# Patient Record
Sex: FEMALE | URBAN - METROPOLITAN AREA
[De-identification: names, ages, dates, MRNs, and addresses within clinical notes are randomized per-mention and may not be internally consistent; named-entity substitution may affect disease eponyms.]

---

## 2020-07-25 ENCOUNTER — TELEPHONE ENCOUNTER (OUTPATIENT)
Dept: URBAN - METROPOLITAN AREA CLINIC 13 | Facility: CLINIC | Age: 75
End: 2020-07-25

## 2020-07-25 RX ORDER — IPRATROPIUM BROMIDE AND ALBUTEROL SULFATE 2.5; .5 MG/3ML; MG/3ML
USE 1 VIAL VIA NEBULIZER EVERY 8 HOURS MAY USE EVERY 4 HOURS IF NEEDED SOLUTION RESPIRATORY (INHALATION)
Qty: 90 | Refills: 0 | OUTPATIENT
Start: 2012-05-21 | End: 2012-06-19

## 2020-07-25 RX ORDER — ESOMEPRAZOLE MAGNESIUM 40 MG
TAKE 1 CAPSULE TWICE A DAY CAPSULE,DELAYED RELEASE (ENTERIC COATED) ORAL
Qty: 180 | Refills: 4 | OUTPATIENT
Start: 2012-04-06 | End: 2017-10-24

## 2020-07-25 RX ORDER — CITALOPRAM 20 MG/1
TAKE 1 TABLET DAILY AS DIRECTED TABLET ORAL
Refills: 0 | OUTPATIENT
End: 2017-10-24

## 2020-07-25 RX ORDER — OMEPRAZOLE 40 MG/1
TAKE 1 CAPSULE BY MOUTH  DAILY CAPSULE, DELAYED RELEASE ORAL
Qty: 90 | Refills: 0 | OUTPATIENT
End: 2018-10-18

## 2020-07-25 RX ORDER — ESOMEPRAZOLE MAGNESIUM 40 MG
TAKE 1 CAPSULE DAILY CAPSULE,DELAYED RELEASE (ENTERIC COATED) ORAL
Refills: 0 | OUTPATIENT
End: 2012-06-14

## 2020-07-25 RX ORDER — PANTOPRAZOLE SODIUM 40 MG/1
TAKE 1 TABLET DAILY TABLET, DELAYED RELEASE ORAL
Qty: 60 | Refills: 11 | OUTPATIENT
End: 2019-11-26

## 2020-07-25 RX ORDER — OMEPRAZOLE 40 MG/1
TAKE ONE CAPSULE BY MOUTH TWICE A DAY CAPSULE, DELAYED RELEASE ORAL
Qty: 60 | Refills: 5 | OUTPATIENT
Start: 2012-02-21 | End: 2012-03-08

## 2020-07-26 ENCOUNTER — TELEPHONE ENCOUNTER (OUTPATIENT)
Dept: URBAN - METROPOLITAN AREA CLINIC 13 | Facility: CLINIC | Age: 75
End: 2020-07-26

## 2020-07-26 RX ORDER — MONTELUKAST SODIUM 10 MG/1
TABLET, FILM COATED ORAL
Qty: 90 | Refills: 0 | Status: ACTIVE | COMMUNITY
Start: 2019-01-16

## 2020-07-26 RX ORDER — ALPRAZOLAM 0.5 MG/1
TAKE 1 TABLET BY MOUTH 3 TIMES A DAY AS NEEDED FOR PANIC TABLET ORAL
Qty: 30 | Refills: 0 | Status: ACTIVE | COMMUNITY
Start: 2012-06-15

## 2020-07-26 RX ORDER — HYDROXYZINE HYDROCHLORIDE 10 MG/1
TABLET ORAL
Qty: 15 | Refills: 0 | Status: ACTIVE | COMMUNITY
Start: 2018-10-22

## 2020-07-26 RX ORDER — CYCLOBENZAPRINE HYDROCHLORIDE 10 MG/1
TABLET, FILM COATED ORAL
Qty: 60 | Refills: 0 | Status: ACTIVE | COMMUNITY
Start: 2020-02-18

## 2020-07-26 RX ORDER — HYDROCODONE BITARTRATE AND ACETAMINOPHEN 10; 325 MG/1; MG/1
TABLET ORAL
Qty: 30 | Refills: 0 | Status: ACTIVE | COMMUNITY
Start: 2019-07-08

## 2020-07-26 RX ORDER — LOTEPREDNOL ETABONATE AND TOBRAMYCIN 5; 3 MG/ML; MG/ML
SUSPENSION/ DROPS OPHTHALMIC
Qty: 5 | Refills: 0 | Status: ACTIVE | COMMUNITY
Start: 2019-03-08

## 2020-07-26 RX ORDER — METHYLPREDNISOLONE 4 MG/1
TABLET ORAL
Qty: 21 | Refills: 0 | Status: ACTIVE | COMMUNITY
Start: 2019-11-20

## 2020-07-26 RX ORDER — PREDNISONE 10 MG/1
TABLET ORAL
Qty: 21 | Refills: 0 | Status: ACTIVE | COMMUNITY
Start: 2018-01-15

## 2020-07-26 RX ORDER — LEVOFLOXACIN 500 MG/1
TABLET, FILM COATED ORAL
Qty: 10 | Refills: 0 | Status: ACTIVE | COMMUNITY
Start: 2019-02-07

## 2020-07-26 RX ORDER — FLUTICASONE PROPIONATE 50 UG/1
USE 1 SPRAY IN EACH NOSTRIL AFTER SALINE IN MORNING AND EVENING SPRAY, METERED NASAL
Qty: 16 | Refills: 0 | Status: ACTIVE | COMMUNITY
Start: 2011-12-05

## 2020-07-26 RX ORDER — LORATADINE 10 MG/1
TABLET ORAL
Qty: 90 | Refills: 0 | Status: ACTIVE | COMMUNITY
Start: 2019-01-08

## 2020-07-26 RX ORDER — MELOXICAM 15 MG/1
TABLET ORAL
Qty: 30 | Refills: 0 | Status: ACTIVE | COMMUNITY
Start: 2019-09-23

## 2020-07-26 RX ORDER — AMLODIPINE BESYLATE 2.5 MG/1
TAKE 1 TABLET DAILY TABLET ORAL
Refills: 0 | Status: ACTIVE | COMMUNITY
Start: 2019-11-18

## 2020-07-26 RX ORDER — HYDROCODONE BITARTRATE AND HOMATROPINE METHYLBROMIDE 5; 1.5 MG/5ML; MG/5ML
SOLUTION ORAL
Qty: 100 | Refills: 0 | Status: ACTIVE | COMMUNITY
Start: 2019-10-25

## 2020-07-26 RX ORDER — CYCLOBENZAPRINE HYDROCHLORIDE 10 MG/1
TABLET, FILM COATED ORAL
Qty: 15 | Refills: 0 | Status: ACTIVE | COMMUNITY
Start: 2019-09-18

## 2020-07-26 RX ORDER — CITALOPRAM 20 MG/1
TABLET ORAL
Qty: 30 | Refills: 0 | Status: ACTIVE | COMMUNITY
Start: 2012-06-15

## 2020-07-26 RX ORDER — BENZONATATE 100 MG/1
CAPSULE ORAL
Qty: 60 | Refills: 0 | Status: ACTIVE | COMMUNITY
Start: 2019-02-07

## 2020-07-26 RX ORDER — AZITHROMYCIN DIHYDRATE 250 MG/1
TABLET, FILM COATED ORAL
Qty: 6 | Refills: 0 | Status: ACTIVE | COMMUNITY
Start: 2019-11-20

## 2020-07-26 RX ORDER — HYDROCODONE BITARTRATE AND ACETAMINOPHEN 10; 325 MG/1; MG/1
TABLET ORAL
Qty: 31 | Refills: 0 | Status: ACTIVE | COMMUNITY
Start: 2019-09-23

## 2020-07-26 RX ORDER — ALBUTEROL SULFATE 90 UG/1
AEROSOL, METERED RESPIRATORY (INHALATION)
Qty: 8 | Refills: 0 | Status: ACTIVE | COMMUNITY
Start: 2012-04-24

## 2020-07-26 RX ORDER — AMOXICILLIN AND CLAVULANATE POTASSIUM 875; 125 MG/1; MG/1
TABLET, FILM COATED ORAL
Qty: 28 | Refills: 0 | Status: ACTIVE | COMMUNITY
Start: 2018-01-15

## 2020-07-26 RX ORDER — HYDROCODONE BITARTRATE AND HOMATROPINE METHYLBROMIDE 5; 1.5 MG/5ML; MG/5ML
SOLUTION ORAL
Qty: 80 | Refills: 0 | Status: ACTIVE | COMMUNITY
Start: 2019-02-18

## 2020-07-26 RX ORDER — SCOPOLAMINE 1 MG/3 DAY
PATCH,TRANSDERMAL 3 DAY TRANSDERMAL
Qty: 1 | Refills: 0 | Status: ACTIVE | COMMUNITY
Start: 2019-07-08

## 2020-07-26 RX ORDER — ERYTHROMYCIN 5 MG/G
OINTMENT OPHTHALMIC
Qty: 3 | Refills: 0 | Status: ACTIVE | COMMUNITY
Start: 2019-07-08

## 2020-07-26 RX ORDER — RANOLAZINE 500 MG/1
TAKE 1 TABLET BY MOUTH EVERY 12 HOURS TABLET, FILM COATED, EXTENDED RELEASE ORAL
Qty: 60 | Refills: 0 | Status: ACTIVE | COMMUNITY
Start: 2011-11-17

## 2020-07-26 RX ORDER — CITALOPRAM 40 MG/1
TAKE 1 TABLET DAILY TABLET ORAL
Refills: 0 | Status: ACTIVE | COMMUNITY

## 2020-07-26 RX ORDER — LEVOFLOXACIN 500 MG/1
TABLET, FILM COATED ORAL
Qty: 10 | Refills: 0 | Status: ACTIVE | COMMUNITY
Start: 2018-01-31

## 2020-07-26 RX ORDER — ONDANSETRON 8 MG/1
TABLET, ORALLY DISINTEGRATING ORAL
Qty: 20 | Refills: 0 | Status: ACTIVE | COMMUNITY
Start: 2019-07-08

## 2020-07-26 RX ORDER — ISOSORBIDE MONONITRATE 60 MG/1
TABLET, EXTENDED RELEASE ORAL
Qty: 45 | Refills: 0 | Status: ACTIVE | COMMUNITY
Start: 2018-07-17

## 2020-07-26 RX ORDER — DIAZEPAM 10 MG/1
TABLET ORAL
Qty: 1 | Refills: 0 | Status: ACTIVE | COMMUNITY
Start: 2019-12-23

## 2020-07-26 RX ORDER — NITROFURANTOIN MONOHYDRATE/MACROCRYSTALLINE 25; 75 MG/1; MG/1
CAPSULE ORAL
Qty: 14 | Refills: 0 | Status: ACTIVE | COMMUNITY
Start: 2018-08-17

## 2020-07-26 RX ORDER — OMEPRAZOLE 20 MG/1
CAPSULE, DELAYED RELEASE ORAL
Qty: 60 | Refills: 0 | Status: ACTIVE | COMMUNITY
Start: 2018-01-31

## 2020-07-26 RX ORDER — AMOXICILLIN AND CLAVULANATE POTASSIUM 875; 125 MG/1; MG/1
TABLET, FILM COATED ORAL
Qty: 14 | Refills: 0 | Status: ACTIVE | COMMUNITY
Start: 2019-09-18

## 2020-07-26 RX ORDER — CEPHALEXIN 500 MG/1
TAKE ONE CAPSULE BY MOUTH TWICE A DAY FOR 5 DAYS CAPSULE ORAL
Qty: 10 | Refills: 0 | Status: ACTIVE | COMMUNITY
Start: 2012-03-15

## 2020-07-26 RX ORDER — PREDNISONE 5 MG/1
TABLET ORAL
Qty: 42 | Refills: 0 | Status: ACTIVE | COMMUNITY
Start: 2019-06-10

## 2020-07-26 RX ORDER — BUPROPION HYDROCHLORIDE 150 MG/1
TABLET, FILM COATED, EXTENDED RELEASE ORAL
Qty: 30 | Refills: 0 | Status: ACTIVE | COMMUNITY
Start: 2018-08-17

## 2020-07-26 RX ORDER — FLUTICASONE PROPIONATE 50 UG/1
POWDER, METERED RESPIRATORY (INHALATION)
Qty: 60 | Refills: 0 | Status: ACTIVE | COMMUNITY
Start: 2019-10-31

## 2020-07-26 RX ORDER — PREDNISONE 20 MG/1
TABLET ORAL
Qty: 5 | Refills: 0 | Status: ACTIVE | COMMUNITY
Start: 2019-09-18

## 2020-07-26 RX ORDER — TRAMADOL HYDROCHLORIDE 50 MG/1
TABLET ORAL
Qty: 20 | Refills: 0 | Status: ACTIVE | COMMUNITY
Start: 2019-02-07

## 2020-07-26 RX ORDER — ESOMEPRAZOLE MAGNESIUM 40 MG/1
TAKE ONE CAPSULE BY MOUTH TWICE DAILY CAPSULE, DELAYED RELEASE PELLETS ORAL
Qty: 180 | Refills: 3 | Status: ACTIVE | COMMUNITY
Start: 2018-10-18

## 2020-07-26 RX ORDER — BENZONATATE 100 MG/1
CAPSULE ORAL
Qty: 20 | Refills: 0 | Status: ACTIVE | COMMUNITY
Start: 2019-11-20

## 2020-07-26 RX ORDER — GABAPENTIN 100 MG/1
CAPSULE ORAL
Qty: 21 | Refills: 0 | Status: ACTIVE | COMMUNITY
Start: 2019-06-10

## 2020-07-26 RX ORDER — PREDNISONE 10 MG/1
TABLET ORAL
Qty: 18 | Refills: 0 | Status: ACTIVE | COMMUNITY
Start: 2018-10-24

## 2020-07-26 RX ORDER — ZOLPIDEM TARTRATE 5 MG/1
TAKE 1 TABLET BY MOUTH AT BEDTIME TABLET, FILM COATED ORAL
Qty: 30 | Refills: 0 | Status: ACTIVE | COMMUNITY
Start: 2011-11-09

## 2020-07-26 RX ORDER — PREDNISONE 20 MG/1
TABLET ORAL
Qty: 10 | Refills: 0 | Status: ACTIVE | COMMUNITY
Start: 2019-02-07

## 2020-07-26 RX ORDER — DIAZEPAM 10 MG/1
TABLET ORAL
Qty: 1 | Refills: 0 | Status: ACTIVE | COMMUNITY
Start: 2019-08-12

## 2020-07-26 RX ORDER — FLUTICASONE PROPIONATE 50 UG/1
POWDER, METERED RESPIRATORY (INHALATION)
Qty: 60 | Refills: 0 | Status: ACTIVE | COMMUNITY
Start: 2019-01-16

## 2020-07-26 RX ORDER — PREDNISONE 20 MG/1
TABLET ORAL
Qty: 10 | Refills: 0 | Status: ACTIVE | COMMUNITY
Start: 2019-02-18

## 2020-07-26 RX ORDER — MONTELUKAST SODIUM 10 MG/1
TABLET, FILM COATED ORAL
Qty: 90 | Refills: 0 | Status: ACTIVE | COMMUNITY
Start: 2019-06-26

## 2020-07-26 RX ORDER — MELOXICAM 15 MG/1
TABLET ORAL
Qty: 90 | Refills: 0 | Status: ACTIVE | COMMUNITY
Start: 2019-07-09

## 2020-07-26 RX ORDER — NEOMYCIN SULFATE, POLYMYXIN B SULFATE AND DEXAMETHASONE 3.5; 10000; 1 MG/ML; [USP'U]/ML; MG/ML
SUSPENSION/ DROPS OPHTHALMIC
Qty: 5 | Refills: 0 | Status: ACTIVE | COMMUNITY
Start: 2018-01-31

## 2020-07-26 RX ORDER — TRIAMCINOLONE ACETONIDE 1 MG/G
CREAM TOPICAL
Qty: 454 | Refills: 0 | Status: ACTIVE | COMMUNITY
Start: 2018-09-24

## 2020-07-26 RX ORDER — LEVOFLOXACIN 750 MG/1
TABLET, FILM COATED ORAL
Qty: 7 | Refills: 0 | Status: ACTIVE | COMMUNITY
Start: 2019-10-25

## 2021-03-25 ENCOUNTER — ERX REFILL RESPONSE (OUTPATIENT)
Dept: URBAN - METROPOLITAN AREA CLINIC 113 | Facility: CLINIC | Age: 76
End: 2021-03-25

## 2021-03-25 RX ORDER — ESOMEPRAZOLE MAGNESIUM 40 MG/1
TAKE ONE CAPSULE BY MOUTH TWICE DAILY CAPSULE, DELAYED RELEASE ORAL
Qty: 180 | Refills: 0

## 2023-01-05 ENCOUNTER — WEB ENCOUNTER (OUTPATIENT)
Dept: URBAN - METROPOLITAN AREA CLINIC 2 | Facility: CLINIC | Age: 78
End: 2023-01-05

## 2023-01-11 ENCOUNTER — OFFICE VISIT (OUTPATIENT)
Dept: URBAN - METROPOLITAN AREA CLINIC 113 | Facility: CLINIC | Age: 78
End: 2023-01-11
Payer: MEDICARE

## 2023-01-11 ENCOUNTER — DASHBOARD ENCOUNTERS (OUTPATIENT)
Age: 78
End: 2023-01-11

## 2023-01-11 VITALS
WEIGHT: 143 LBS | HEART RATE: 59 BPM | TEMPERATURE: 97.5 F | BODY MASS INDEX: 26.31 KG/M2 | HEIGHT: 62 IN | SYSTOLIC BLOOD PRESSURE: 130 MMHG | DIASTOLIC BLOOD PRESSURE: 66 MMHG | RESPIRATION RATE: 16 BRPM

## 2023-01-11 DIAGNOSIS — R13.19 ESOPHAGEAL DYSPHAGIA: ICD-10-CM

## 2023-01-11 DIAGNOSIS — K21.9 GASTROESOPHAGEAL REFLUX DISEASE, UNSPECIFIED WHETHER ESOPHAGITIS PRESENT: ICD-10-CM

## 2023-01-11 PROBLEM — 235595009: Status: ACTIVE | Noted: 2023-01-11

## 2023-01-11 PROCEDURE — 99203 OFFICE O/P NEW LOW 30 MIN: CPT | Performed by: NURSE PRACTITIONER

## 2023-01-11 RX ORDER — OMEPRAZOLE 20 MG/1
CAPSULE, DELAYED RELEASE ORAL
Qty: 60 | Refills: 0 | Status: ON HOLD | COMMUNITY
Start: 2018-01-31

## 2023-01-11 RX ORDER — CITALOPRAM 40 MG/1
TAKE 1 TABLET DAILY TABLET ORAL
Refills: 0 | Status: ACTIVE | COMMUNITY

## 2023-01-11 RX ORDER — ESOMEPRAZOLE MAGNESIUM 40 MG/1
TAKE ONE CAPSULE BY MOUTH TWICE DAILY CAPSULE, DELAYED RELEASE ORAL
Qty: 180 | Refills: 0 | Status: ON HOLD | COMMUNITY

## 2023-01-11 RX ORDER — ALBUTEROL SULFATE 90 UG/1
AEROSOL, METERED RESPIRATORY (INHALATION)
Qty: 8 | Refills: 0 | Status: ON HOLD | COMMUNITY
Start: 2012-04-24

## 2023-01-11 RX ORDER — BENZONATATE 100 MG/1
CAPSULE ORAL
Qty: 60 | Refills: 0 | Status: ON HOLD | COMMUNITY
Start: 2019-02-07

## 2023-01-11 RX ORDER — MONTELUKAST SODIUM 10 MG/1
TABLET, FILM COATED ORAL
Qty: 90 | Refills: 0 | Status: ON HOLD | COMMUNITY
Start: 2019-01-16

## 2023-01-11 RX ORDER — ALPRAZOLAM 0.5 MG/1
TAKE 1 TABLET BY MOUTH 3 TIMES A DAY AS NEEDED FOR PANIC TABLET ORAL
Qty: 30 | Refills: 0 | Status: ACTIVE | COMMUNITY
Start: 2012-06-15

## 2023-01-11 RX ORDER — METHYLPREDNISOLONE 4 MG/1
TABLET ORAL
Qty: 21 | Refills: 0 | Status: ON HOLD | COMMUNITY
Start: 2019-11-20

## 2023-01-11 RX ORDER — LOTEPREDNOL ETABONATE AND TOBRAMYCIN 5; 3 MG/ML; MG/ML
SUSPENSION/ DROPS OPHTHALMIC
Qty: 5 | Refills: 0 | Status: ON HOLD | COMMUNITY
Start: 2019-03-08

## 2023-01-11 RX ORDER — ISOSORBIDE MONONITRATE 60 MG/1
TABLET, EXTENDED RELEASE ORAL
Qty: 45 | Refills: 0 | Status: ACTIVE | COMMUNITY
Start: 2018-07-17

## 2023-01-11 RX ORDER — AMOXICILLIN AND CLAVULANATE POTASSIUM 875; 125 MG/1; MG/1
TABLET, FILM COATED ORAL
Qty: 28 | Refills: 0 | Status: ON HOLD | COMMUNITY
Start: 2018-01-15

## 2023-01-11 RX ORDER — TRAMADOL HYDROCHLORIDE 50 MG/1
TABLET ORAL
Qty: 20 | Refills: 0 | Status: ON HOLD | COMMUNITY
Start: 2019-02-07

## 2023-01-11 RX ORDER — ERYTHROMYCIN 5 MG/G
OINTMENT OPHTHALMIC
Qty: 3 | Refills: 0 | Status: ON HOLD | COMMUNITY
Start: 2019-07-08

## 2023-01-11 RX ORDER — TRIAMCINOLONE ACETONIDE 1 MG/G
CREAM TOPICAL
Qty: 454 | Refills: 0 | Status: ON HOLD | COMMUNITY
Start: 2018-09-24

## 2023-01-11 RX ORDER — PANTOPRAZOLE SODIUM 40 MG/1
1 TABLET TABLET, DELAYED RELEASE ORAL TWICE A DAY
Status: ACTIVE | COMMUNITY

## 2023-01-11 RX ORDER — HYDROXYZINE HYDROCHLORIDE 10 MG/1
TABLET ORAL
Qty: 15 | Refills: 0 | Status: ON HOLD | COMMUNITY
Start: 2018-10-22

## 2023-01-11 RX ORDER — DIAZEPAM 10 MG/1
TABLET ORAL
Qty: 1 | Refills: 0 | Status: ON HOLD | COMMUNITY
Start: 2019-08-12

## 2023-01-11 RX ORDER — PREDNISONE 5 MG/1
TABLET ORAL
Qty: 42 | Refills: 0 | Status: ON HOLD | COMMUNITY
Start: 2019-06-10

## 2023-01-11 RX ORDER — PREDNISONE 10 MG/1
TABLET ORAL
Qty: 18 | Refills: 0 | Status: ON HOLD | COMMUNITY
Start: 2018-10-24

## 2023-01-11 RX ORDER — NEOMYCIN SULFATE, POLYMYXIN B SULFATE AND DEXAMETHASONE 3.5; 10000; 1 MG/ML; [USP'U]/ML; MG/ML
SUSPENSION/ DROPS OPHTHALMIC
Qty: 5 | Refills: 0 | Status: ON HOLD | COMMUNITY
Start: 2018-01-31

## 2023-01-11 RX ORDER — FLUTICASONE PROPIONATE 50 UG/1
USE 1 SPRAY IN EACH NOSTRIL AFTER SALINE IN MORNING AND EVENING SPRAY, METERED NASAL
Qty: 16 | Refills: 0 | Status: ON HOLD | COMMUNITY
Start: 2011-12-05

## 2023-01-11 RX ORDER — CITALOPRAM 20 MG/1
TABLET ORAL
Qty: 30 | Refills: 0 | Status: ACTIVE | COMMUNITY
Start: 2012-06-15

## 2023-01-11 RX ORDER — ONDANSETRON 8 MG/1
TABLET, ORALLY DISINTEGRATING ORAL
Qty: 20 | Refills: 0 | Status: ON HOLD | COMMUNITY
Start: 2019-07-08

## 2023-01-11 RX ORDER — LORATADINE 10 MG/1
TABLET ORAL
Qty: 90 | Refills: 0 | Status: ON HOLD | COMMUNITY
Start: 2019-01-08

## 2023-01-11 RX ORDER — RANOLAZINE 500 MG/1
TAKE 1 TABLET BY MOUTH EVERY 12 HOURS TABLET, FILM COATED, EXTENDED RELEASE ORAL
Qty: 60 | Refills: 0 | Status: ON HOLD | COMMUNITY
Start: 2011-11-17

## 2023-01-11 RX ORDER — MELOXICAM 15 MG/1
TABLET ORAL
Qty: 90 | Refills: 0 | Status: ON HOLD | COMMUNITY
Start: 2019-07-09

## 2023-01-11 RX ORDER — PREDNISONE 10 MG/1
TABLET ORAL
Qty: 21 | Refills: 0 | Status: ON HOLD | COMMUNITY
Start: 2018-01-15

## 2023-01-11 RX ORDER — CYCLOBENZAPRINE HYDROCHLORIDE 10 MG/1
TABLET, FILM COATED ORAL
Qty: 15 | Refills: 0 | Status: ON HOLD | COMMUNITY
Start: 2019-09-18

## 2023-01-11 RX ORDER — ZOLPIDEM TARTRATE 5 MG/1
TAKE 1 TABLET BY MOUTH AT BEDTIME TABLET, FILM COATED ORAL
Qty: 30 | Refills: 0 | Status: ON HOLD | COMMUNITY
Start: 2011-11-09

## 2023-01-11 RX ORDER — LEVOFLOXACIN 500 MG/1
TABLET, FILM COATED ORAL
Qty: 10 | Refills: 0 | Status: ON HOLD | COMMUNITY
Start: 2018-01-31

## 2023-01-11 RX ORDER — PREDNISONE 20 MG/1
TABLET ORAL
Qty: 10 | Refills: 0 | Status: ON HOLD | COMMUNITY
Start: 2019-02-07

## 2023-01-11 RX ORDER — NITROFURANTOIN MONOHYDRATE/MACROCRYSTALLINE 25; 75 MG/1; MG/1
CAPSULE ORAL
Qty: 14 | Refills: 0 | Status: ON HOLD | COMMUNITY
Start: 2018-08-17

## 2023-01-11 RX ORDER — BUPROPION HYDROCHLORIDE 150 MG/1
TABLET, FILM COATED, EXTENDED RELEASE ORAL
Qty: 30 | Refills: 0 | Status: ON HOLD | COMMUNITY
Start: 2018-08-17

## 2023-01-11 RX ORDER — FLUTICASONE PROPIONATE 50 UG/1
POWDER, METERED RESPIRATORY (INHALATION)
Qty: 60 | Refills: 0 | Status: ON HOLD | COMMUNITY
Start: 2019-01-16

## 2023-01-11 RX ORDER — ALBUTEROL SULFATE 90 UG/1
1 PUFF AS NEEDED AEROSOL, METERED RESPIRATORY (INHALATION)
Status: ACTIVE | COMMUNITY

## 2023-01-11 RX ORDER — AZITHROMYCIN DIHYDRATE 250 MG/1
TABLET, FILM COATED ORAL
Qty: 6 | Refills: 0 | Status: ON HOLD | COMMUNITY
Start: 2019-11-20

## 2023-01-11 RX ORDER — METOPROLOL SUCCINATE 100 MG/1
1 TABLET TABLET, FILM COATED, EXTENDED RELEASE ORAL ONCE A DAY
Status: ACTIVE | COMMUNITY

## 2023-01-11 RX ORDER — HYDROCODONE BITARTRATE AND ACETAMINOPHEN 10; 325 MG/1; MG/1
TABLET ORAL
Qty: 30 | Refills: 0 | Status: ON HOLD | COMMUNITY
Start: 2019-07-08

## 2023-01-11 RX ORDER — CEPHALEXIN 500 MG/1
TAKE ONE CAPSULE BY MOUTH TWICE A DAY FOR 5 DAYS CAPSULE ORAL
Qty: 10 | Refills: 0 | Status: ON HOLD | COMMUNITY
Start: 2012-03-15

## 2023-01-11 RX ORDER — AMLODIPINE BESYLATE 2.5 MG/1
TAKE 1 TABLET DAILY TABLET ORAL
Refills: 0 | Status: ACTIVE | COMMUNITY
Start: 2019-11-18

## 2023-01-11 RX ORDER — HYDROCODONE BITARTRATE AND HOMATROPINE METHYLBROMIDE 5; 1.5 MG/5ML; MG/5ML
SOLUTION ORAL
Qty: 80 | Refills: 0 | Status: ON HOLD | COMMUNITY
Start: 2019-02-18

## 2023-01-11 RX ORDER — GABAPENTIN 100 MG/1
CAPSULE ORAL
Qty: 21 | Refills: 0 | Status: ON HOLD | COMMUNITY
Start: 2019-06-10

## 2023-01-11 RX ORDER — SCOPOLAMINE 1 MG/3 DAY
PATCH,TRANSDERMAL 3 DAY TRANSDERMAL
Qty: 1 | Refills: 0 | Status: ON HOLD | COMMUNITY
Start: 2019-07-08

## 2023-01-11 RX ORDER — LEVOFLOXACIN 750 MG/1
TABLET, FILM COATED ORAL
Qty: 7 | Refills: 0 | Status: ON HOLD | COMMUNITY
Start: 2019-10-25

## 2023-03-14 ENCOUNTER — OFFICE VISIT (OUTPATIENT)
Dept: URBAN - METROPOLITAN AREA CLINIC 113 | Facility: CLINIC | Age: 78
End: 2023-03-14

## 2025-01-16 PROBLEM — 1293164004: Status: ACTIVE | Noted: 2025-01-16

## 2025-01-17 ENCOUNTER — LAB OUTSIDE AN ENCOUNTER (OUTPATIENT)
Dept: URBAN - METROPOLITAN AREA CLINIC 113 | Facility: CLINIC | Age: 80
End: 2025-01-17

## 2025-01-17 ENCOUNTER — OFFICE VISIT (OUTPATIENT)
Dept: URBAN - METROPOLITAN AREA CLINIC 113 | Facility: CLINIC | Age: 80
End: 2025-01-17
Payer: MEDICARE

## 2025-01-17 VITALS
DIASTOLIC BLOOD PRESSURE: 66 MMHG | HEIGHT: 62 IN | SYSTOLIC BLOOD PRESSURE: 153 MMHG | RESPIRATION RATE: 18 BRPM | TEMPERATURE: 96.8 F | HEART RATE: 61 BPM | WEIGHT: 142.3 LBS | BODY MASS INDEX: 26.19 KG/M2

## 2025-01-17 DIAGNOSIS — R13.19 ESOPHAGEAL DYSPHAGIA: ICD-10-CM

## 2025-01-17 DIAGNOSIS — J39.2 CRICOPHARYNGEAL HYPERTROPHY: ICD-10-CM

## 2025-01-17 DIAGNOSIS — K21.9 GASTROESOPHAGEAL REFLUX DISEASE, UNSPECIFIED WHETHER ESOPHAGITIS PRESENT: ICD-10-CM

## 2025-01-17 PROCEDURE — 99203 OFFICE O/P NEW LOW 30 MIN: CPT | Performed by: INTERNAL MEDICINE

## 2025-01-17 RX ORDER — PREDNISONE 10 MG/1
TABLET ORAL
Qty: 18 | Refills: 0 | Status: ON HOLD | COMMUNITY
Start: 2018-10-24

## 2025-01-17 RX ORDER — TRAMADOL HYDROCHLORIDE 50 MG/1
TABLET, FILM COATED ORAL
Qty: 20 | Refills: 0 | Status: ON HOLD | COMMUNITY
Start: 2019-02-07

## 2025-01-17 RX ORDER — AZITHROMYCIN DIHYDRATE 250 MG/1
TABLET, FILM COATED ORAL
Qty: 6 | Refills: 0 | Status: ON HOLD | COMMUNITY
Start: 2019-11-20

## 2025-01-17 RX ORDER — CITALOPRAM 40 MG/1
TAKE 1 TABLET DAILY TABLET ORAL
Refills: 0 | Status: ACTIVE | COMMUNITY

## 2025-01-17 RX ORDER — PREDNISONE 20 MG/1
TABLET ORAL
Qty: 10 | Refills: 0 | Status: ON HOLD | COMMUNITY
Start: 2019-02-07

## 2025-01-17 RX ORDER — LOTEPREDNOL ETABONATE AND TOBRAMYCIN 5; 3 MG/ML; MG/ML
SUSPENSION/ DROPS OPHTHALMIC
Qty: 5 | Refills: 0 | Status: ON HOLD | COMMUNITY
Start: 2019-03-08

## 2025-01-17 RX ORDER — HYDROCODONE BITARTRATE AND ACETAMINOPHEN 10; 325 MG/1; MG/1
TABLET ORAL
Qty: 30 | Refills: 0 | Status: ON HOLD | COMMUNITY
Start: 2019-07-08

## 2025-01-17 RX ORDER — CEPHALEXIN 500 MG/1
TAKE ONE CAPSULE BY MOUTH TWICE A DAY FOR 5 DAYS CAPSULE ORAL
Qty: 10 | Refills: 0 | Status: ON HOLD | COMMUNITY
Start: 2012-03-15

## 2025-01-17 RX ORDER — METHYLPREDNISOLONE 4 MG/1
TABLET ORAL
Qty: 21 | Refills: 0 | Status: ON HOLD | COMMUNITY
Start: 2019-11-20

## 2025-01-17 RX ORDER — ALBUTEROL SULFATE 90 UG/1
AEROSOL, METERED RESPIRATORY (INHALATION)
Qty: 8 | Refills: 0 | Status: ON HOLD | COMMUNITY
Start: 2012-04-24

## 2025-01-17 RX ORDER — METOPROLOL SUCCINATE 100 MG/1
1 TABLET TABLET, FILM COATED, EXTENDED RELEASE ORAL ONCE A DAY
Status: ACTIVE | COMMUNITY

## 2025-01-17 RX ORDER — OMEPRAZOLE 20 MG/1
CAPSULE, DELAYED RELEASE ORAL
Qty: 60 | Refills: 0 | Status: ON HOLD | COMMUNITY
Start: 2018-01-31

## 2025-01-17 RX ORDER — ALBUTEROL SULFATE 90 UG/1
1 PUFF AS NEEDED AEROSOL, METERED RESPIRATORY (INHALATION)
Status: ON HOLD | COMMUNITY

## 2025-01-17 RX ORDER — ONDANSETRON 8 MG/1
TABLET, ORALLY DISINTEGRATING ORAL
Qty: 20 | Refills: 0 | Status: ON HOLD | COMMUNITY
Start: 2019-07-08

## 2025-01-17 RX ORDER — LEVOFLOXACIN 500 MG/1
TABLET, FILM COATED ORAL
Qty: 10 | Refills: 0 | Status: ON HOLD | COMMUNITY
Start: 2018-01-31

## 2025-01-17 RX ORDER — FLUTICASONE PROPIONATE 50 UG/1
POWDER, METERED RESPIRATORY (INHALATION)
Qty: 60 | Refills: 0 | Status: ON HOLD | COMMUNITY
Start: 2019-01-16

## 2025-01-17 RX ORDER — SCOPOLAMINE 1 MG/3 DAY
PATCH,TRANSDERMAL 3 DAY TRANSDERMAL
Qty: 1 | Refills: 0 | Status: ON HOLD | COMMUNITY
Start: 2019-07-08

## 2025-01-17 RX ORDER — LEVOFLOXACIN 750 MG/1
TABLET, FILM COATED ORAL
Qty: 7 | Refills: 0 | Status: ON HOLD | COMMUNITY
Start: 2019-10-25

## 2025-01-17 RX ORDER — HYDROCODONE BITARTRATE AND HOMATROPINE METHYLBROMIDE 5; 1.5 MG/5ML; MG/5ML
SOLUTION ORAL
Qty: 80 | Refills: 0 | Status: ON HOLD | COMMUNITY
Start: 2019-02-18

## 2025-01-17 RX ORDER — AMLODIPINE BESYLATE 2.5 MG/1
TAKE 1 TABLET DAILY TABLET ORAL
Refills: 0 | Status: ACTIVE | COMMUNITY
Start: 2019-11-18

## 2025-01-17 RX ORDER — DIAZEPAM 10 MG/1
TABLET ORAL
Qty: 1 | Refills: 0 | Status: ON HOLD | COMMUNITY
Start: 2019-08-12

## 2025-01-17 RX ORDER — GABAPENTIN 100 MG/1
CAPSULE ORAL
Qty: 21 | Refills: 0 | Status: ON HOLD | COMMUNITY
Start: 2019-06-10

## 2025-01-17 RX ORDER — ZOLPIDEM TARTRATE 5 MG/1
TAKE 1 TABLET BY MOUTH AT BEDTIME TABLET, FILM COATED ORAL
Qty: 30 | Refills: 0 | Status: ON HOLD | COMMUNITY
Start: 2011-11-09

## 2025-01-17 RX ORDER — NITROFURANTOIN MONOHYDRATE/MACROCRYSTALLINE 25; 75 MG/1; MG/1
CAPSULE ORAL
Qty: 14 | Refills: 0 | Status: ON HOLD | COMMUNITY
Start: 2018-08-17

## 2025-01-17 RX ORDER — AMOXICILLIN AND CLAVULANATE POTASSIUM 875; 125 MG/1; MG/1
TABLET, FILM COATED ORAL
Qty: 28 | Refills: 0 | Status: ON HOLD | COMMUNITY
Start: 2018-01-15

## 2025-01-17 RX ORDER — CYCLOBENZAPRINE HYDROCHLORIDE 10 MG/1
TABLET, FILM COATED ORAL
Qty: 15 | Refills: 0 | Status: ON HOLD | COMMUNITY
Start: 2019-09-18

## 2025-01-17 RX ORDER — BENZONATATE 100 MG/1
CAPSULE ORAL
Qty: 60 | Refills: 0 | Status: ON HOLD | COMMUNITY
Start: 2019-02-07

## 2025-01-17 RX ORDER — HYDROXYZINE HYDROCHLORIDE 10 MG/1
TABLET ORAL
Qty: 15 | Refills: 0 | Status: ON HOLD | COMMUNITY
Start: 2018-10-22

## 2025-01-17 RX ORDER — MONTELUKAST SODIUM 10 MG/1
TABLET, FILM COATED ORAL
Qty: 90 | Refills: 0 | Status: ON HOLD | COMMUNITY
Start: 2019-01-16

## 2025-01-17 RX ORDER — MELOXICAM 15 MG/1
TABLET ORAL
Qty: 90 | Refills: 0 | Status: ON HOLD | COMMUNITY
Start: 2019-07-09

## 2025-01-17 RX ORDER — CITALOPRAM 20 MG/1
TABLET ORAL
Qty: 30 | Refills: 0 | Status: ON HOLD | COMMUNITY
Start: 2012-06-15

## 2025-01-17 RX ORDER — LORATADINE 10 MG/1
TABLET ORAL
Qty: 90 | Refills: 0 | Status: ON HOLD | COMMUNITY
Start: 2019-01-08

## 2025-01-17 RX ORDER — ALPRAZOLAM 0.5 MG/1
TAKE 1 TABLET BY MOUTH 3 TIMES A DAY AS NEEDED FOR PANIC TABLET ORAL
Qty: 30 | Refills: 0 | Status: ACTIVE | COMMUNITY
Start: 2012-06-15

## 2025-01-17 RX ORDER — PANTOPRAZOLE SODIUM 40 MG/1
1 TABLET TABLET, DELAYED RELEASE ORAL TWICE A DAY
Status: ACTIVE | COMMUNITY

## 2025-01-17 RX ORDER — RANOLAZINE 500 MG/1
TAKE 1 TABLET BY MOUTH EVERY 12 HOURS TABLET, FILM COATED, EXTENDED RELEASE ORAL
Qty: 60 | Refills: 0 | Status: ON HOLD | COMMUNITY
Start: 2011-11-17

## 2025-01-17 RX ORDER — ERYTHROMYCIN 5 MG/G
OINTMENT OPHTHALMIC
Qty: 3 | Refills: 0 | Status: ON HOLD | COMMUNITY
Start: 2019-07-08

## 2025-01-17 RX ORDER — DICLOFENAC SODIUM 25 MG/1
1 TABLET AS NEEDED TABLET, DELAYED RELEASE ORAL TWICE A DAY
Status: ACTIVE | COMMUNITY

## 2025-01-17 RX ORDER — ESOMEPRAZOLE MAGNESIUM 40 MG/1
TAKE ONE CAPSULE BY MOUTH TWICE DAILY CAPSULE, DELAYED RELEASE ORAL
Qty: 180 | Refills: 0 | Status: ON HOLD | COMMUNITY

## 2025-01-17 RX ORDER — FLUTICASONE PROPIONATE 50 UG/1
USE 1 SPRAY IN EACH NOSTRIL AFTER SALINE IN MORNING AND EVENING SPRAY, METERED NASAL
Qty: 16 | Refills: 0 | Status: ON HOLD | COMMUNITY
Start: 2011-12-05

## 2025-01-17 RX ORDER — PREDNISONE 5 MG/1
TABLET ORAL
Qty: 42 | Refills: 0 | Status: ON HOLD | COMMUNITY
Start: 2019-06-10

## 2025-01-17 RX ORDER — NEOMYCIN SULFATE, POLYMYXIN B SULFATE AND DEXAMETHASONE 3.5; 10000; 1 MG/ML; [USP'U]/ML; MG/ML
SUSPENSION/ DROPS OPHTHALMIC
Qty: 5 | Refills: 0 | Status: ON HOLD | COMMUNITY
Start: 2018-01-31

## 2025-01-17 RX ORDER — PREDNISONE 10 MG/1
TABLET ORAL
Qty: 21 | Refills: 0 | Status: ON HOLD | COMMUNITY
Start: 2018-01-15

## 2025-01-17 RX ORDER — BUPROPION HYDROCHLORIDE 150 MG/1
TABLET, FILM COATED, EXTENDED RELEASE ORAL
Qty: 30 | Refills: 0 | Status: ON HOLD | COMMUNITY
Start: 2018-08-17

## 2025-01-17 RX ORDER — TRIAMCINOLONE ACETONIDE 1 MG/G
CREAM TOPICAL
Qty: 454 | Refills: 0 | Status: ON HOLD | COMMUNITY
Start: 2018-09-24

## 2025-01-17 RX ORDER — ISOSORBIDE MONONITRATE 60 MG/1
TABLET, EXTENDED RELEASE ORAL
Qty: 45 | Refills: 0 | Status: ACTIVE | COMMUNITY
Start: 2018-07-17

## 2025-01-17 NOTE — HPI-TODAY'S VISIT:
79-year-old female with a history of esophageal reflux and dysphagia, GERD, alternating bowel habits, presenting for long interval follow-up regarding esophageal dysphagia.  She was seen in March 2020 following EGD with dilation in December 2019.  GERD symptoms were controlled with Nexium twice daily.  EGD showed cricopharyngeal hypertrophy but did not reveal definitive strictures.  Empiric dilation improved dysphagia symptoms.  She was seen inthe ED at [facility] on 12/26/22 for complaints of cough, having recently been treated for sinusitis with Augmentin. SpO2 was 99% on room air in the ED. She was bradycardic with a heart rate of 49. Physical exam revealed rhinorrhea and wheezing. Nasal swabs negative for flu and Covid. She was diagnosed with acute bronchitis. She also saw her PCP on 12/12/22 for complaints of epigastric pain, for which she was recommended CTAP with contrast. CT 12/14/22 revealed a moderate sized hiatal hernia, which was likely causing her epigastric pain and shortness of breath. Surgery was not recommended.  She had partial thyroidectomy by Dr. Aaron about 6 months ago for thyroid cancer.  In December she was having difficulty swallowing and chest pain evaluated by cardiology as an inpatient including echocardiogram and monitoring, all of which was negative.  She was recommended to follow-up with GI.  She does occasionally choke on liquids and solids where it goes down the wrong pipe.  Sometimes she coughs with that.  In addition she has difficulty initiating swallow with both solids and liquids but after she swallows, it goes down.  She does regurgitate liquids and solids intermittently.  She is on pantoprazole every day but still gets heartburn symptoms frequently.  She does get some epigastric discomfort every day that seems to be worse after meals.  Tomato sauce and tomato products seem to make it worse.  She does get nausea intermittently about 2 or 3 times per week.  She occasionally can vomit and that happens the last about a week ago.  It was both food and liquid.  There was no blood she moves her bowels every day there is been no blood or melena.  She does have a history of mild aortic regurgitation and mild mitral regurgitation.  She plans to see Dr. Cohen her cardiologist in February.

## 2025-01-17 NOTE — HPI-OTHER HISTORIES
Barium swallow on 1/8/2025 revealed no aspiration.  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  There is mild tortuosity of the lower esophagus and tertiary contractions are present.  There is a moderate hiatal hernia.  A 13 mm barium tablet passed into stomach without difficulty.  There is moderate reflux into the mid thoracic esophagus.  EGD on 12/4/2019 revealed Z-line to be regular at 37 cm.  A 46 Lebanese Campos dilation followed by a 48 Lebanese Campos dilation was performed with mild mucosal disruption.  Random esophageal biopsies revealed no significant abnormality, specifically no eosinophilic esophagitis.  There was erythema the gastric antrum and body biopsies revealed chemical gastropathy negative for H. pylori.  The duodenum was normal

## 2025-01-28 ENCOUNTER — WEB ENCOUNTER (OUTPATIENT)
Dept: URBAN - METROPOLITAN AREA CLINIC 113 | Facility: CLINIC | Age: 80
End: 2025-01-28

## 2025-02-03 ENCOUNTER — TELEPHONE ENCOUNTER (OUTPATIENT)
Dept: URBAN - METROPOLITAN AREA CLINIC 113 | Facility: CLINIC | Age: 80
End: 2025-02-03

## 2025-02-04 ENCOUNTER — WEB ENCOUNTER (OUTPATIENT)
Dept: URBAN - METROPOLITAN AREA CLINIC 113 | Facility: CLINIC | Age: 80
End: 2025-02-04

## 2025-02-06 ENCOUNTER — TELEPHONE ENCOUNTER (OUTPATIENT)
Dept: URBAN - METROPOLITAN AREA CLINIC 113 | Facility: CLINIC | Age: 80
End: 2025-02-06

## 2025-02-13 ENCOUNTER — CLAIMS CREATED FROM THE CLAIM WINDOW (OUTPATIENT)
Dept: URBAN - METROPOLITAN AREA SURGERY CENTER 25 | Facility: SURGERY CENTER | Age: 80
End: 2025-02-13
Payer: MEDICARE

## 2025-02-13 ENCOUNTER — CLAIMS CREATED FROM THE CLAIM WINDOW (OUTPATIENT)
Dept: URBAN - METROPOLITAN AREA CLINIC 4 | Facility: CLINIC | Age: 80
End: 2025-02-13
Payer: MEDICARE

## 2025-02-13 DIAGNOSIS — K31.89 OTHER DISEASES OF STOMACH AND DUODENUM: ICD-10-CM

## 2025-02-13 DIAGNOSIS — R13.19 OTHER DYSPHAGIA: ICD-10-CM

## 2025-02-13 DIAGNOSIS — K22.89 OTHER SPECIFIED DISEASE OF ESOPHAGUS: ICD-10-CM

## 2025-02-13 DIAGNOSIS — T47.1X5A ADVERSE EFFECT OF PROTON PUMP INHIBITOR: ICD-10-CM

## 2025-02-13 DIAGNOSIS — K44.9 HIATAL HERNIA: ICD-10-CM

## 2025-02-13 PROCEDURE — 88305 TISSUE EXAM BY PATHOLOGIST: CPT | Performed by: PATHOLOGY

## 2025-02-13 PROCEDURE — 43248 EGD GUIDE WIRE INSERTION: CPT | Performed by: CLINIC/CENTER

## 2025-02-13 PROCEDURE — 00731 ANES UPR GI NDSC PX NOS: CPT | Performed by: NURSE ANESTHETIST, CERTIFIED REGISTERED

## 2025-02-13 PROCEDURE — 43248 EGD GUIDE WIRE INSERTION: CPT | Performed by: INTERNAL MEDICINE

## 2025-02-13 PROCEDURE — 43239 EGD BIOPSY SINGLE/MULTIPLE: CPT | Performed by: CLINIC/CENTER

## 2025-02-13 PROCEDURE — 43239 EGD BIOPSY SINGLE/MULTIPLE: CPT | Performed by: INTERNAL MEDICINE

## 2025-02-13 PROCEDURE — 00731 ANES UPR GI NDSC PX NOS: CPT | Performed by: ANESTHESIOLOGY

## 2025-02-13 RX ORDER — CEPHALEXIN 500 MG/1
TAKE ONE CAPSULE BY MOUTH TWICE A DAY FOR 5 DAYS CAPSULE ORAL
Qty: 10 | Refills: 0 | Status: ON HOLD | COMMUNITY
Start: 2012-03-15

## 2025-02-13 RX ORDER — ISOSORBIDE MONONITRATE 60 MG/1
TABLET, EXTENDED RELEASE ORAL
Qty: 45 | Refills: 0 | Status: ACTIVE | COMMUNITY
Start: 2018-07-17

## 2025-02-13 RX ORDER — AMOXICILLIN AND CLAVULANATE POTASSIUM 875; 125 MG/1; MG/1
TABLET, FILM COATED ORAL
Qty: 28 | Refills: 0 | Status: ON HOLD | COMMUNITY
Start: 2018-01-15

## 2025-02-13 RX ORDER — TRIAMCINOLONE ACETONIDE 1 MG/G
CREAM TOPICAL
Qty: 454 | Refills: 0 | Status: ON HOLD | COMMUNITY
Start: 2018-09-24

## 2025-02-13 RX ORDER — TRAMADOL HYDROCHLORIDE 50 MG/1
TABLET, FILM COATED ORAL
Qty: 20 | Refills: 0 | Status: ON HOLD | COMMUNITY
Start: 2019-02-07

## 2025-02-13 RX ORDER — FLUTICASONE PROPIONATE 50 UG/1
POWDER, METERED RESPIRATORY (INHALATION)
Qty: 60 | Refills: 0 | Status: ON HOLD | COMMUNITY
Start: 2019-01-16

## 2025-02-13 RX ORDER — LEVOFLOXACIN 500 MG/1
TABLET, FILM COATED ORAL
Qty: 10 | Refills: 0 | Status: ON HOLD | COMMUNITY
Start: 2018-01-31

## 2025-02-13 RX ORDER — ONDANSETRON 8 MG/1
TABLET, ORALLY DISINTEGRATING ORAL
Qty: 20 | Refills: 0 | Status: ON HOLD | COMMUNITY
Start: 2019-07-08

## 2025-02-13 RX ORDER — ESOMEPRAZOLE MAGNESIUM 40 MG/1
TAKE ONE CAPSULE BY MOUTH TWICE DAILY CAPSULE, DELAYED RELEASE ORAL
Qty: 180 | Refills: 0 | Status: ON HOLD | COMMUNITY

## 2025-02-13 RX ORDER — METHYLPREDNISOLONE 4 MG/1
TABLET ORAL
Qty: 21 | Refills: 0 | Status: ON HOLD | COMMUNITY
Start: 2019-11-20

## 2025-02-13 RX ORDER — RANOLAZINE 500 MG/1
TAKE 1 TABLET BY MOUTH EVERY 12 HOURS TABLET, FILM COATED, EXTENDED RELEASE ORAL
Qty: 60 | Refills: 0 | Status: ON HOLD | COMMUNITY
Start: 2011-11-17

## 2025-02-13 RX ORDER — AMLODIPINE BESYLATE 2.5 MG/1
TAKE 1 TABLET DAILY TABLET ORAL
Refills: 0 | Status: ACTIVE | COMMUNITY
Start: 2019-11-18

## 2025-02-13 RX ORDER — LORATADINE 10 MG/1
TABLET ORAL
Qty: 90 | Refills: 0 | Status: ON HOLD | COMMUNITY
Start: 2019-01-08

## 2025-02-13 RX ORDER — DIAZEPAM 10 MG/1
TABLET ORAL
Qty: 1 | Refills: 0 | Status: ON HOLD | COMMUNITY
Start: 2019-08-12

## 2025-02-13 RX ORDER — ALPRAZOLAM 0.5 MG/1
TAKE 1 TABLET BY MOUTH 3 TIMES A DAY AS NEEDED FOR PANIC TABLET ORAL
Qty: 30 | Refills: 0 | Status: ACTIVE | COMMUNITY
Start: 2012-06-15

## 2025-02-13 RX ORDER — NEOMYCIN SULFATE, POLYMYXIN B SULFATE AND DEXAMETHASONE 3.5; 10000; 1 MG/ML; [USP'U]/ML; MG/ML
SUSPENSION/ DROPS OPHTHALMIC
Qty: 5 | Refills: 0 | Status: ON HOLD | COMMUNITY
Start: 2018-01-31

## 2025-02-13 RX ORDER — ALBUTEROL SULFATE 90 UG/1
AEROSOL, METERED RESPIRATORY (INHALATION)
Qty: 8 | Refills: 0 | Status: ON HOLD | COMMUNITY
Start: 2012-04-24

## 2025-02-13 RX ORDER — METOPROLOL SUCCINATE 100 MG/1
1 TABLET TABLET, FILM COATED, EXTENDED RELEASE ORAL ONCE A DAY
Status: ACTIVE | COMMUNITY

## 2025-02-13 RX ORDER — AZITHROMYCIN DIHYDRATE 250 MG/1
TABLET, FILM COATED ORAL
Qty: 6 | Refills: 0 | Status: ON HOLD | COMMUNITY
Start: 2019-11-20

## 2025-02-13 RX ORDER — DICLOFENAC SODIUM 25 MG/1
1 TABLET AS NEEDED TABLET, DELAYED RELEASE ORAL TWICE A DAY
Status: ACTIVE | COMMUNITY

## 2025-02-13 RX ORDER — CYCLOBENZAPRINE HYDROCHLORIDE 10 MG/1
TABLET, FILM COATED ORAL
Qty: 15 | Refills: 0 | Status: ON HOLD | COMMUNITY
Start: 2019-09-18

## 2025-02-13 RX ORDER — ALBUTEROL SULFATE 90 UG/1
1 PUFF AS NEEDED AEROSOL, METERED RESPIRATORY (INHALATION)
Status: ON HOLD | COMMUNITY

## 2025-02-13 RX ORDER — PREDNISONE 10 MG/1
TABLET ORAL
Qty: 18 | Refills: 0 | Status: ON HOLD | COMMUNITY
Start: 2018-10-24

## 2025-02-13 RX ORDER — HYDROCODONE BITARTRATE AND HOMATROPINE METHYLBROMIDE 5; 1.5 MG/5ML; MG/5ML
SOLUTION ORAL
Qty: 80 | Refills: 0 | Status: ON HOLD | COMMUNITY
Start: 2019-02-18

## 2025-02-13 RX ORDER — NITROFURANTOIN MONOHYDRATE/MACROCRYSTALLINE 25; 75 MG/1; MG/1
CAPSULE ORAL
Qty: 14 | Refills: 0 | Status: ON HOLD | COMMUNITY
Start: 2018-08-17

## 2025-02-13 RX ORDER — BENZONATATE 100 MG/1
CAPSULE ORAL
Qty: 60 | Refills: 0 | Status: ON HOLD | COMMUNITY
Start: 2019-02-07

## 2025-02-13 RX ORDER — CITALOPRAM 20 MG/1
TABLET ORAL
Qty: 30 | Refills: 0 | Status: ON HOLD | COMMUNITY
Start: 2012-06-15

## 2025-02-13 RX ORDER — PANTOPRAZOLE SODIUM 40 MG/1
1 TABLET TABLET, DELAYED RELEASE ORAL TWICE A DAY
Status: ACTIVE | COMMUNITY

## 2025-02-13 RX ORDER — ZOLPIDEM TARTRATE 5 MG/1
TAKE 1 TABLET BY MOUTH AT BEDTIME TABLET, FILM COATED ORAL
Qty: 30 | Refills: 0 | Status: ON HOLD | COMMUNITY
Start: 2011-11-09

## 2025-02-13 RX ORDER — PREDNISONE 5 MG/1
TABLET ORAL
Qty: 42 | Refills: 0 | Status: ON HOLD | COMMUNITY
Start: 2019-06-10

## 2025-02-13 RX ORDER — PREDNISONE 10 MG/1
TABLET ORAL
Qty: 21 | Refills: 0 | Status: ON HOLD | COMMUNITY
Start: 2018-01-15

## 2025-02-13 RX ORDER — FLUTICASONE PROPIONATE 50 UG/1
USE 1 SPRAY IN EACH NOSTRIL AFTER SALINE IN MORNING AND EVENING SPRAY, METERED NASAL
Qty: 16 | Refills: 0 | Status: ON HOLD | COMMUNITY
Start: 2011-12-05

## 2025-02-13 RX ORDER — CITALOPRAM 40 MG/1
TAKE 1 TABLET DAILY TABLET ORAL
Refills: 0 | Status: ACTIVE | COMMUNITY

## 2025-02-13 RX ORDER — OMEPRAZOLE 20 MG/1
CAPSULE, DELAYED RELEASE ORAL
Qty: 60 | Refills: 0 | Status: ON HOLD | COMMUNITY
Start: 2018-01-31

## 2025-02-13 RX ORDER — SCOPOLAMINE 1 MG/3 DAY
PATCH,TRANSDERMAL 3 DAY TRANSDERMAL
Qty: 1 | Refills: 0 | Status: ON HOLD | COMMUNITY
Start: 2019-07-08

## 2025-02-13 RX ORDER — HYDROXYZINE HYDROCHLORIDE 10 MG/1
TABLET ORAL
Qty: 15 | Refills: 0 | Status: ON HOLD | COMMUNITY
Start: 2018-10-22

## 2025-02-13 RX ORDER — MELOXICAM 15 MG/1
TABLET ORAL
Qty: 90 | Refills: 0 | Status: ON HOLD | COMMUNITY
Start: 2019-07-09

## 2025-02-13 RX ORDER — MONTELUKAST SODIUM 10 MG/1
TABLET, FILM COATED ORAL
Qty: 90 | Refills: 0 | Status: ON HOLD | COMMUNITY
Start: 2019-01-16

## 2025-02-13 RX ORDER — LOTEPREDNOL ETABONATE AND TOBRAMYCIN 5; 3 MG/ML; MG/ML
SUSPENSION/ DROPS OPHTHALMIC
Qty: 5 | Refills: 0 | Status: ON HOLD | COMMUNITY
Start: 2019-03-08

## 2025-02-13 RX ORDER — BUPROPION HYDROCHLORIDE 150 MG/1
TABLET, FILM COATED, EXTENDED RELEASE ORAL
Qty: 30 | Refills: 0 | Status: ON HOLD | COMMUNITY
Start: 2018-08-17

## 2025-02-13 RX ORDER — LEVOFLOXACIN 750 MG/1
TABLET, FILM COATED ORAL
Qty: 7 | Refills: 0 | Status: ON HOLD | COMMUNITY
Start: 2019-10-25

## 2025-02-13 RX ORDER — HYDROCODONE BITARTRATE AND ACETAMINOPHEN 10; 325 MG/1; MG/1
TABLET ORAL
Qty: 30 | Refills: 0 | Status: ON HOLD | COMMUNITY
Start: 2019-07-08

## 2025-02-13 RX ORDER — ERYTHROMYCIN 5 MG/G
OINTMENT OPHTHALMIC
Qty: 3 | Refills: 0 | Status: ON HOLD | COMMUNITY
Start: 2019-07-08

## 2025-02-13 RX ORDER — GABAPENTIN 100 MG/1
CAPSULE ORAL
Qty: 21 | Refills: 0 | Status: ON HOLD | COMMUNITY
Start: 2019-06-10

## 2025-02-13 RX ORDER — PREDNISONE 20 MG/1
TABLET ORAL
Qty: 10 | Refills: 0 | Status: ON HOLD | COMMUNITY
Start: 2019-02-07
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
## 2025-03-05 ENCOUNTER — OFFICE VISIT (OUTPATIENT)
Dept: URBAN - METROPOLITAN AREA CLINIC 113 | Facility: CLINIC | Age: 80
End: 2025-03-05
Payer: MEDICARE

## 2025-03-05 VITALS
HEART RATE: 49 BPM | TEMPERATURE: 97.2 F | BODY MASS INDEX: 25.65 KG/M2 | WEIGHT: 139.4 LBS | HEIGHT: 62 IN | SYSTOLIC BLOOD PRESSURE: 155 MMHG | RESPIRATION RATE: 18 BRPM | DIASTOLIC BLOOD PRESSURE: 69 MMHG

## 2025-03-05 DIAGNOSIS — K21.9 GASTROESOPHAGEAL REFLUX DISEASE, UNSPECIFIED WHETHER ESOPHAGITIS PRESENT: ICD-10-CM

## 2025-03-05 DIAGNOSIS — R13.19 ESOPHAGEAL DYSPHAGIA: ICD-10-CM

## 2025-03-05 DIAGNOSIS — J39.2 CRICOPHARYNGEAL HYPERTROPHY: ICD-10-CM

## 2025-03-05 PROCEDURE — 99214 OFFICE O/P EST MOD 30 MIN: CPT | Performed by: NURSE PRACTITIONER

## 2025-03-05 RX ORDER — SUCRALFATE 1 G/1
1 TABLET ON AN EMPTY STOMACH TABLET ORAL TWICE A DAY
Qty: 180 TABLET | Refills: 3 | OUTPATIENT
Start: 2025-03-05 | End: 2026-02-28

## 2025-03-05 RX ORDER — BUPROPION HYDROCHLORIDE 150 MG/1
TABLET, FILM COATED, EXTENDED RELEASE ORAL
Qty: 30 | Refills: 0 | Status: ON HOLD | COMMUNITY
Start: 2018-08-17

## 2025-03-05 RX ORDER — ESOMEPRAZOLE MAGNESIUM 40 MG/1
TAKE ONE CAPSULE BY MOUTH TWICE DAILY CAPSULE, DELAYED RELEASE ORAL
Qty: 180 | Refills: 0 | Status: ON HOLD | COMMUNITY

## 2025-03-05 RX ORDER — ERYTHROMYCIN 5 MG/G
OINTMENT OPHTHALMIC
Qty: 3 | Refills: 0 | Status: ON HOLD | COMMUNITY
Start: 2019-07-08

## 2025-03-05 RX ORDER — AMOXICILLIN AND CLAVULANATE POTASSIUM 875; 125 MG/1; MG/1
TABLET, FILM COATED ORAL
Qty: 28 | Refills: 0 | Status: ON HOLD | COMMUNITY
Start: 2018-01-15

## 2025-03-05 RX ORDER — DICLOFENAC SODIUM 25 MG/1
1 TABLET AS NEEDED TABLET, DELAYED RELEASE ORAL TWICE A DAY
Status: ON HOLD | COMMUNITY

## 2025-03-05 RX ORDER — OMEPRAZOLE 20 MG/1
CAPSULE, DELAYED RELEASE ORAL
Qty: 60 | Refills: 0 | Status: ON HOLD | COMMUNITY
Start: 2018-01-31

## 2025-03-05 RX ORDER — FLUTICASONE PROPIONATE 50 UG/1
POWDER, METERED RESPIRATORY (INHALATION)
Qty: 60 | Refills: 0 | Status: ON HOLD | COMMUNITY
Start: 2019-01-16

## 2025-03-05 RX ORDER — HYDROCODONE BITARTRATE AND HOMATROPINE METHYLBROMIDE 5; 1.5 MG/5ML; MG/5ML
SOLUTION ORAL
Qty: 80 | Refills: 0 | Status: ON HOLD | COMMUNITY
Start: 2019-02-18

## 2025-03-05 RX ORDER — HYDROXYZINE HYDROCHLORIDE 10 MG/1
TABLET ORAL
Qty: 15 | Refills: 0 | Status: ON HOLD | COMMUNITY
Start: 2018-10-22

## 2025-03-05 RX ORDER — MONTELUKAST SODIUM 10 MG/1
TABLET, FILM COATED ORAL
Qty: 90 | Refills: 0 | Status: ON HOLD | COMMUNITY
Start: 2019-01-16

## 2025-03-05 RX ORDER — CEPHALEXIN 500 MG/1
TAKE ONE CAPSULE BY MOUTH TWICE A DAY FOR 5 DAYS CAPSULE ORAL
Qty: 10 | Refills: 0 | Status: ON HOLD | COMMUNITY
Start: 2012-03-15

## 2025-03-05 RX ORDER — ALPRAZOLAM 0.5 MG/1
TAKE 1 TABLET BY MOUTH 3 TIMES A DAY AS NEEDED FOR PANIC TABLET ORAL
Qty: 30 | Refills: 0 | Status: ACTIVE | COMMUNITY
Start: 2012-06-15

## 2025-03-05 RX ORDER — ZOLPIDEM TARTRATE 5 MG/1
TAKE 1 TABLET BY MOUTH AT BEDTIME TABLET, FILM COATED ORAL
Qty: 30 | Refills: 0 | Status: ON HOLD | COMMUNITY
Start: 2011-11-09

## 2025-03-05 RX ORDER — GABAPENTIN 100 MG/1
CAPSULE ORAL
Qty: 21 | Refills: 0 | Status: ON HOLD | COMMUNITY
Start: 2019-06-10

## 2025-03-05 RX ORDER — PANTOPRAZOLE SODIUM 40 MG/1
1 TABLET TABLET, DELAYED RELEASE ORAL TWICE A DAY
Status: ACTIVE | COMMUNITY

## 2025-03-05 RX ORDER — METHYLPREDNISOLONE 4 MG/1
TABLET ORAL
Qty: 21 | Refills: 0 | Status: ON HOLD | COMMUNITY
Start: 2019-11-20

## 2025-03-05 RX ORDER — RANOLAZINE 500 MG/1
TAKE 1 TABLET BY MOUTH EVERY 12 HOURS TABLET, FILM COATED, EXTENDED RELEASE ORAL
Qty: 60 | Refills: 0 | Status: ON HOLD | COMMUNITY
Start: 2011-11-17

## 2025-03-05 RX ORDER — DIAZEPAM 10 MG/1
TABLET ORAL
Qty: 1 | Refills: 0 | Status: ON HOLD | COMMUNITY
Start: 2019-08-12

## 2025-03-05 RX ORDER — PREDNISONE 10 MG/1
TABLET ORAL
Qty: 18 | Refills: 0 | Status: ON HOLD | COMMUNITY
Start: 2018-10-24

## 2025-03-05 RX ORDER — CYCLOBENZAPRINE HYDROCHLORIDE 10 MG/1
TABLET, FILM COATED ORAL
Qty: 15 | Refills: 0 | Status: ON HOLD | COMMUNITY
Start: 2019-09-18

## 2025-03-05 RX ORDER — LOTEPREDNOL ETABONATE AND TOBRAMYCIN 5; 3 MG/ML; MG/ML
SUSPENSION/ DROPS OPHTHALMIC
Qty: 5 | Refills: 0 | Status: ON HOLD | COMMUNITY
Start: 2019-03-08

## 2025-03-05 RX ORDER — SCOPOLAMINE 1 MG/3 DAY
PATCH,TRANSDERMAL 3 DAY TRANSDERMAL
Qty: 1 | Refills: 0 | Status: ON HOLD | COMMUNITY
Start: 2019-07-08

## 2025-03-05 RX ORDER — LEVOFLOXACIN 750 MG/1
TABLET, FILM COATED ORAL
Qty: 7 | Refills: 0 | Status: ON HOLD | COMMUNITY
Start: 2019-10-25

## 2025-03-05 RX ORDER — CITALOPRAM 20 MG/1
TABLET ORAL
Qty: 30 | Refills: 0 | Status: ON HOLD | COMMUNITY
Start: 2012-06-15

## 2025-03-05 RX ORDER — ALBUTEROL SULFATE 90 UG/1
1 PUFF AS NEEDED AEROSOL, METERED RESPIRATORY (INHALATION)
Status: ON HOLD | COMMUNITY

## 2025-03-05 RX ORDER — CITALOPRAM 40 MG/1
TAKE 1 TABLET DAILY TABLET ORAL
Refills: 0 | Status: ACTIVE | COMMUNITY

## 2025-03-05 RX ORDER — ONDANSETRON 8 MG/1
TABLET, ORALLY DISINTEGRATING ORAL
Qty: 20 | Refills: 0 | Status: ON HOLD | COMMUNITY
Start: 2019-07-08

## 2025-03-05 RX ORDER — MELOXICAM 15 MG/1
TABLET ORAL
Qty: 90 | Refills: 0 | Status: ON HOLD | COMMUNITY
Start: 2019-07-09

## 2025-03-05 RX ORDER — LORATADINE 10 MG/1
TABLET ORAL
Qty: 90 | Refills: 0 | Status: ON HOLD | COMMUNITY
Start: 2019-01-08

## 2025-03-05 RX ORDER — ALBUTEROL SULFATE 90 UG/1
AEROSOL, METERED RESPIRATORY (INHALATION)
Qty: 8 | Refills: 0 | Status: ON HOLD | COMMUNITY
Start: 2012-04-24

## 2025-03-05 RX ORDER — AZITHROMYCIN DIHYDRATE 250 MG/1
TABLET, FILM COATED ORAL
Qty: 6 | Refills: 0 | Status: ON HOLD | COMMUNITY
Start: 2019-11-20

## 2025-03-05 RX ORDER — PREDNISONE 20 MG/1
TABLET ORAL
Qty: 10 | Refills: 0 | Status: ON HOLD | COMMUNITY
Start: 2019-02-07

## 2025-03-05 RX ORDER — HYDROCODONE BITARTRATE AND ACETAMINOPHEN 10; 325 MG/1; MG/1
TABLET ORAL
Qty: 30 | Refills: 0 | Status: ON HOLD | COMMUNITY
Start: 2019-07-08

## 2025-03-05 RX ORDER — ISOSORBIDE MONONITRATE 60 MG/1
TABLET, EXTENDED RELEASE ORAL
Qty: 45 | Refills: 0 | Status: ACTIVE | COMMUNITY
Start: 2018-07-17

## 2025-03-05 RX ORDER — PREDNISONE 5 MG/1
TABLET ORAL
Qty: 42 | Refills: 0 | Status: ON HOLD | COMMUNITY
Start: 2019-06-10

## 2025-03-05 RX ORDER — FLUTICASONE PROPIONATE 50 UG/1
USE 1 SPRAY IN EACH NOSTRIL AFTER SALINE IN MORNING AND EVENING SPRAY, METERED NASAL
Qty: 16 | Refills: 0 | Status: ON HOLD | COMMUNITY
Start: 2011-12-05

## 2025-03-05 RX ORDER — PREDNISONE 10 MG/1
TABLET ORAL
Qty: 21 | Refills: 0 | Status: ON HOLD | COMMUNITY
Start: 2018-01-15

## 2025-03-05 RX ORDER — AMLODIPINE BESYLATE 2.5 MG/1
TAKE 1 TABLET DAILY TABLET ORAL
Refills: 0 | Status: ACTIVE | COMMUNITY
Start: 2019-11-18

## 2025-03-05 RX ORDER — TRIAMCINOLONE ACETONIDE 1 MG/G
CREAM TOPICAL
Qty: 454 | Refills: 0 | Status: ON HOLD | COMMUNITY
Start: 2018-09-24

## 2025-03-05 RX ORDER — NEOMYCIN SULFATE, POLYMYXIN B SULFATE AND DEXAMETHASONE 3.5; 10000; 1 MG/ML; [USP'U]/ML; MG/ML
SUSPENSION/ DROPS OPHTHALMIC
Qty: 5 | Refills: 0 | Status: ON HOLD | COMMUNITY
Start: 2018-01-31

## 2025-03-05 RX ORDER — METOPROLOL SUCCINATE 100 MG/1
1 TABLET TABLET, FILM COATED, EXTENDED RELEASE ORAL ONCE A DAY
Status: ACTIVE | COMMUNITY

## 2025-03-05 RX ORDER — TRAMADOL HYDROCHLORIDE 50 MG/1
TABLET, FILM COATED ORAL
Qty: 20 | Refills: 0 | Status: ON HOLD | COMMUNITY
Start: 2019-02-07

## 2025-03-05 RX ORDER — LEVOFLOXACIN 500 MG/1
TABLET, FILM COATED ORAL
Qty: 10 | Refills: 0 | Status: ON HOLD | COMMUNITY
Start: 2018-01-31

## 2025-03-05 RX ORDER — BENZONATATE 100 MG/1
CAPSULE ORAL
Qty: 60 | Refills: 0 | Status: ON HOLD | COMMUNITY
Start: 2019-02-07

## 2025-03-05 RX ORDER — NITROFURANTOIN 25; 75 MG/1; MG/1
CAPSULE ORAL
Qty: 14 | Refills: 0 | Status: ON HOLD | COMMUNITY
Start: 2018-08-17

## 2025-03-05 NOTE — HPI-TODAY'S VISIT:
79-year-old woman with a history of esophageal dysphagia with cricopharyngeal hypertrophy and GERD, presenting for follow-up after an EGD with dilation.  Prior barium swallow revealed a cricopharyngeal bar and tertiary contractions of the esophagus.  A 13 mm tablet passed into the stomach without difficulty.  Dysphagia was felt to be a combination of cricopharyngeal bar and esophageal dysmotility.  She has a history of empiric dilation in December 2019 up to 48 Thai Campos dilation.  This did not improve her swallowing complaints. EGD 2/13/2025:Regular Z-line, 36 cm from the incisors status post dilation, normal mucosa in the entire esophagus status post biopsies.  3 cm hiatal hernia.  Erythematous mucosa in the gastric body status post biopsies.  Normal examined duodenum.  Esophagus biopsies revealed squamous mucosa without significant abnormality.  Stomach body biopsy revealed proton pump inhibitor effect, negative for H. pylori, intestinal aplasia, dysplasia or malignancy.  She tells me that her swallowing is improved with esophageal dilation. She does complain of some upper abdominal pain, attributed to her hiatal hernia. She is taking pantoprazole 40 mg daily. There is no nausea or vomiting. She is no longer taking diclofenac. She has bowel movements multiple times daily.